# Patient Record
Sex: FEMALE | Race: WHITE
[De-identification: names, ages, dates, MRNs, and addresses within clinical notes are randomized per-mention and may not be internally consistent; named-entity substitution may affect disease eponyms.]

---

## 2018-04-27 ENCOUNTER — HOSPITAL ENCOUNTER (INPATIENT)
Dept: HOSPITAL 80 - FED | Age: 73
LOS: 7 days | Discharge: NURSING, REHAB OR LONG-TERM CARE | DRG: 917 | End: 2018-05-04
Attending: INTERNAL MEDICINE | Admitting: INTERNAL MEDICINE
Payer: COMMERCIAL

## 2018-04-27 DIAGNOSIS — F34.0: ICD-10-CM

## 2018-04-27 DIAGNOSIS — Z66: ICD-10-CM

## 2018-04-27 DIAGNOSIS — G40.909: ICD-10-CM

## 2018-04-27 DIAGNOSIS — R79.89: ICD-10-CM

## 2018-04-27 DIAGNOSIS — F60.9: ICD-10-CM

## 2018-04-27 DIAGNOSIS — T43.591A: Primary | ICD-10-CM

## 2018-04-27 DIAGNOSIS — I10: ICD-10-CM

## 2018-04-27 DIAGNOSIS — N39.0: ICD-10-CM

## 2018-04-27 DIAGNOSIS — G93.40: ICD-10-CM

## 2018-04-27 DIAGNOSIS — E03.9: ICD-10-CM

## 2018-04-27 DIAGNOSIS — B96.20: ICD-10-CM

## 2018-04-27 DIAGNOSIS — G25.9: ICD-10-CM

## 2018-04-27 DIAGNOSIS — E83.52: ICD-10-CM

## 2018-04-27 DIAGNOSIS — G92: ICD-10-CM

## 2018-04-27 LAB — PLATELET # BLD: 322 10^3/UL (ref 150–400)

## 2018-04-27 RX ADMIN — PERPHENAZINE SCH MG: 8 TABLET, FILM COATED ORAL at 22:06

## 2018-04-27 RX ADMIN — PERPHENAZINE SCH MG: 2 TABLET, FILM COATED ORAL at 22:06

## 2018-04-27 NOTE — CPEKG
Heart Rate: 56

RR Interval: 1071

P-R Interval: 144

QRSD Interval: 92

QT Interval: 492

QTC Interval: 475

P Axis: 67

QRS Axis: 48

T Wave Axis: 79

EKG Severity - ABNORMAL ECG -

EKG Impression: SINUS RHYTHM

EKG Impression: NONSPECIFIC T ABNORMALITIES, ANT-LAT LEADS

Electronically Signed By: Randa Strickland 27-Apr-2018 22:19:58

## 2018-04-27 NOTE — EDPHY
H & P


Stated Complaint: AMS


Time Seen by Provider: 04/27/18 15:22


HPI/ROS: 





CHIEF COMPLAINT:  Altered mental status





HISTORY OF PRESENT ILLNESS:  This is a 73-year-old female transferred from [place] because of an alteration in her mental status.  She has a history of 

cyclothymic disorder, personality disorder, hypothyroidism, seizure disorder, 

hypertension, and CHF.  Reportedly she had a fall 2 weeks ago and again this 

past Sunday, 5 days ago.  Per the paramedics, she has been altered since the 

2nd fall and apparently progressively worsening.  She normally ambulates 

independently but has not done so recently.  Lithium level was elevated at 1.8 

on April 25th, 2 days ago.  She has not received lithium the since that lab was 

drawn.  Patient herself does not provide any history.





REVIEW OF SYSTEMS:


Patient unable to provide.





Past medical history:


1. Cyclothymic disorder


2. Personality disorder


3. Hypertension


4. CHF


5. Seizure disorder


6. PICC line due to difficult IV access.  








Social history:  She resides at [place].  No known alcohol use.  No recent 

tobacco use but did smoke cigarettes in the past.  Her records indicate that 

she has a DNR status.





General Appearance:  Alert.  Vital signs reviewed.  Blood pressure 207/73, 

heart rate 62, respiratory rate 20, room air oxygen saturation 98%.  

Temperature 36.4 degrees.


Eyes:  Pupils equal and round, no conjunctival injection, no discharge. 

Anicteric.


ENT, Mouth:  Mucous membranes are , no oropharyngeal erythema or edema.


Neck:  No lymphadenopathy, supple.


Respiratory:  She does not consistently cooperate with the instruction to 

breathe deeply and it is difficult to hear her breath sounds posterior.  Lungs 

are clear anteriorly.


Cardiovascular:  Regular rate and rhythm; no murmur, rub, or gallop.


Gastrointestinal:  Abdomen is soft and nontender, no masses or organomegaly, 

bowel sounds normal.


Skin:  Warm and dry, no rashes on exposed skin, normal color.


Back:  Nontender to palpation over the thoracolumbar spine. No CVAT.


Extremities:  No lower extremity edema, no calf tenderness or swelling.


Neurological:  She has spontaneous eye opening.  When asked her name she states 

that it is "Nae"and then perseverates, repeating her name.  She follows 

simple commands on occasion but not consistently.  She moves both lower 

extremities to mild pain.  Bilateral upper extremity drift.  GIOVANA.  Conjugate 

gaze.  Facial expressions appear symmetric.  Tongue midline.


Psychiatric:  Flat affect.





- Personal History


Current Tetanus/Diphtheria Vaccine: Unsure


Current Tetanus Diphtheria and Acellular Pertussis (TDAP): Unsure





- Medical/Surgical History


Hx Asthma: No


Hx Chronic Respiratory Disease: No


Hx Diabetes: No


Hx Cardiac Disease: Yes


Hx Renal Disease: No


Hx Cirrhosis: No


Hx Alcoholism: No


Hx HIV/AIDS: No


Hx Splenectomy or Spleen Trauma: No


Other PMH: CHF, sz, extrapyramidal disorder, hypothyroidism, HTN, cyclothymic d/

o





- Social History


Smoking Status: Never smoked


Constitutional: 


 Initial Vital Signs











Temperature (C)  36.4 C   04/27/18 15:19


 


Heart Rate  62   04/27/18 15:19


 


Respiratory Rate  22 H  04/27/18 15:19


 


Blood Pressure  207/73 H  04/27/18 15:19


 


O2 Sat (%)  98   04/27/18 15:19








 











O2 Delivery Mode               Nasal Cannula


 


O2 (L/minute)                  2














Allergies/Adverse Reactions: 


 





No Known Allergies Allergy (Unverified 04/02/15 15:56)


 








Home Medications: 














 Medication  Instructions  Recorded


 


Acetaminophen [Tylenol 325mg (*)] 650 mg PO Q6 PRN 04/27/18


 


Benztropine Mesylate 0.25 mg PO DAILY 04/27/18


 


Herbals/Supplements -Info Only 1 ea PO DAILY 04/27/18


 


Levothyroxine [Synthroid 125 mcg 125 mcg PO DAILY06 04/27/18





(*)]  


 


Perphenazine 10 mg PO BID 04/27/18


 


Phenytoin Sodium Extended 300 mg PO DAILY 04/27/18





[Dilantin (*)]  


 


Quetiapine Fumarate 200 mg PO 08,12 04/27/18


 


Quetiapine Fumarate 500 mg PO HS 04/27/18


 


Sennosides [Senokot 8.6mg (OTC)] 1 each PO DAILY 04/27/18


 


Triamterene/Hctz 37.5/25 [Dyazide 1 each PO DAILY 04/27/18





37.5/25 (*)]  














Medical Decision Making





- Diagnostics


Imaging Results: 


 Imaging Impressions





Chest X-Ray  04/27/18 15:22


Impression:  Airways disease and bibasilar atelectasis.








Head CT  04/27/18 15:23


Impression: Elderly brain with atrophy and probable white matter small vessel 

disease. Nothing acute is identified. 


 


Results called and discussed with MASOUD LEE in the on 4/27/2018 at 16:10


 


 











ED Course/Re-evaluation: 





73-year-old female who is unable to provide much in the way of history.  She is 

transferred here for altered mental status.  Based upon the report I have of 

her baseline functional status, she is altered, time frame is unclear.





She is not febrile.  She is hypertensive.  Heart rate is in the 60s.  She is 

oxygenating well.





She was re-evaluated after returning from CT scan.  At that time she was asking 

me what hospital she is in.  When given the answer, she continues to repeat the 

question.  She is able to follow simple commands but does not do so 

consistently.  She has bilateral upper extremity drift.





Serial evaluations were performed with no change.  She continued to perseverate

, follow simple commands inconsistently.  Her speech is not slurred but she is 

having difficulty answering questions--word salad at times.  She was able to 

move all 4 extremities spontaneously.  Her lithium level remains somewhat high 

at 1.5 and her Dilantin level is low.  She has been hypertensive in the 

department.  I do not find evidence of infection.  Chest x-ray shows bibasilar 

atelectasis and signs of airway disease, no acute infiltrate.  Electrolytes are 

within normal limits.  CT scan is reported to me as negative for any acute 

findings that would explain an altered mental status.  She does have a troponin 

that is elevated at 0.036, intermediate range.  She does not report chest pain 

when questioned.  EKG does not show signs of ischemia.  TSH is pending.  I have 

not seen any seizure activity and do not know the nature of her seizures.  At 

this point I do not suspect status epilepticus.  She does not appear to be 

postictal.





Is certainly possible that she has had a stroke.  If so, the timing is entirely 

unknown.  She would not be a candidate for tPA.  Further imaging studies will 

be required to investigate this.  She has a being admitted to the hospitalist 

service.





Records from [place] indicate that she has a do not resuscitate status.








Differential Diagnosis: 





Altered mental status including but not limited to hypoglycemia, infectious 

process, electrolyte abnormality, seizure, head injury and intoxicants.


Critical Care Time: 





I spent a total of 40 minutes of critical care time in obtaining history, 

performing a physical exam, bedside monitoring of interventions, collecting and 

interpreting tests and discussion with consultants but not including time spent 

performing procedures.  System at risk was central nervous system.





- Data Points


Laboratory Results: 


 Laboratory Results





 04/27/18 15:35 





 04/27/18 15:35 





 











  04/27/18 04/27/18 04/27/18





  15:35 15:35 15:35


 


WBC      9.62 10^3/uL H 10^3/uL





     (3.80-9.50) 


 


RBC      3.84 10^6/uL L 10^6/uL





     (4.18-5.33) 


 


Hgb      11.6 g/dL L g/dL





     (12.6-16.3) 


 


Hct      35.6 % L %





     (38.0-47.0) 


 


MCV      92.7 fL fL





     (81.5-99.8) 


 


MCH      30.2 pg pg





     (27.9-34.1) 


 


MCHC      32.6 g/dL g/dL





     (32.4-36.7) 


 


RDW      13.2 % %





     (11.5-15.2) 


 


Plt Count      322 10^3/uL 10^3/uL





     (150-400) 


 


MPV      10.3 fL fL





     (8.7-11.7) 


 


Neut % (Auto)      64.6 % %





     (39.3-74.2) 


 


Lymph % (Auto)      20.8 % %





     (15.0-45.0) 


 


Mono % (Auto)      10.4 % %





     (4.5-13.0) 


 


Eos % (Auto)      3.4 % %





     (0.6-7.6) 


 


Baso % (Auto)      0.4 % %





     (0.3-1.7) 


 


Nucleat RBC Rel Count      0.0 % %





     (0.0-0.2) 


 


Absolute Neuts (auto)      6.21 10^3/uL 10^3/uL





     (1.70-6.50) 


 


Absolute Lymphs (auto)      2.00 10^3/uL 10^3/uL





     (1.00-3.00) 


 


Absolute Monos (auto)      1.00 10^3/uL H 10^3/uL





     (0.30-0.80) 


 


Absolute Eos (auto)      0.33 10^3/uL 10^3/uL





     (0.03-0.40) 


 


Absolute Basos (auto)      0.04 10^3/uL 10^3/uL





     (0.02-0.10) 


 


Absolute Nucleated RBC      0.00 10^3/uL 10^3/uL





     (0-0.01) 


 


Immature Gran %      0.4 % %





     (0.0-1.1) 


 


Immature Gran #      0.04 10^3/uL 10^3/uL





     (0.00-0.10) 


 


Sodium    138 mEq/L mEq/L  





    (135-145)  


 


Potassium    4.0 mEq/L mEq/L  





    (3.5-5.2)  


 


Chloride    107 mEq/L mEq/L  





    ()  


 


Carbon Dioxide    26 mEq/l mEq/l  





    (22-31)  


 


Anion Gap    5 mEq/L L mEq/L  





    (8-16)  


 


BUN    25 mg/dL H mg/dL  





    (7-23)  


 


Creatinine    1.0 mg/dL mg/dL  





    (0.6-1.0)  


 


Estimated GFR    54   





    


 


Glucose    81 mg/dL mg/dL  





    ()  


 


Calcium    11.0 mg/dL H mg/dL  





    (8.5-10.4)  


 


Phosphorus    4.0 mg/dL mg/dL  





    (2.5-4.5)  


 


Troponin I    0.036 ng/mL H ng/mL  





    (0.000-0.034)  


 


TSH  0.072 uIU/mL L uIU/mL    





   (0.465-4.680)   


 


Urine Color      





    


 


Urine Appearance      





    


 


Urine pH      





    


 


Ur Specific Gravity      





    


 


Urine Protein      





    


 


Urine Ketones      





    


 


Urine Blood      





    


 


Urine Nitrate      





    


 


Urine Bilirubin      





    


 


Urine Urobilinogen      





    


 


Ur Leukocyte Esterase      





    


 


Urine RBC      





    


 


Urine WBC      





    


 


Ur Epithelial Cells      





    


 


Urine Glucose      





    


 


Phenytoin    3.4 mcg/mL L mcg/mL  





    (10.0-20.0)  


 


Lithium    1.5 mEq/L H mEq/L  





    (0.6-1.2)  














  04/27/18





  15:30


 


WBC  





  


 


RBC  





  


 


Hgb  





  


 


Hct  





  


 


MCV  





  


 


MCH  





  


 


MCHC  





  


 


RDW  





  


 


Plt Count  





  


 


MPV  





  


 


Neut % (Auto)  





  


 


Lymph % (Auto)  





  


 


Mono % (Auto)  





  


 


Eos % (Auto)  





  


 


Baso % (Auto)  





  


 


Nucleat RBC Rel Count  





  


 


Absolute Neuts (auto)  





  


 


Absolute Lymphs (auto)  





  


 


Absolute Monos (auto)  





  


 


Absolute Eos (auto)  





  


 


Absolute Basos (auto)  





  


 


Absolute Nucleated RBC  





  


 


Immature Gran %  





  


 


Immature Gran #  





  


 


Sodium  





  


 


Potassium  





  


 


Chloride  





  


 


Carbon Dioxide  





  


 


Anion Gap  





  


 


BUN  





  


 


Creatinine  





  


 


Estimated GFR  





  


 


Glucose  





  


 


Calcium  





  


 


Phosphorus  





  


 


Troponin I  





  


 


TSH  





  


 


Urine Color  YELLOW 





  


 


Urine Appearance  CLEAR 





  


 


Urine pH  6.0 





   (5.0-7.5) 


 


Ur Specific Gravity  1.010 





   (1.002-1.030) 


 


Urine Protein  1+  H 





   (NEGATIVE) 


 


Urine Ketones  NEGATIVE 





   (NEGATIVE) 


 


Urine Blood  1+  H 





   (NEGATIVE) 


 


Urine Nitrate  NEGATIVE 





   (NEGATIVE) 


 


Urine Bilirubin  NEGATIVE 





   (NEGATIVE) 


 


Urine Urobilinogen  NEGATIVE EU EU





   (0.2-1.0) 


 


Ur Leukocyte Esterase  NEGATIVE 





   (NEGATIVE) 


 


Urine RBC  1-3 /hpf /hpf





   (0-3) 


 


Urine WBC  1-3 /hpf /hpf





   (0-3) 


 


Ur Epithelial Cells  NONE SEEN /lpf /lpf





   (NONE-1+) 


 


Urine Glucose  NEGATIVE 





   (NEGATIVE) 


 


Phenytoin  





  


 


Lithium  





  











Medications Given: 


 





Sodium Chloride (Ns)  1,000 mls @ 125 mls/hr IV CONT ERICKA


   Stop: 04/28/18 03:14


   Last Admin: 04/27/18 19:54 Dose:  1,000 mls


Perphenazine (Trilafon)  8 mg PO BID ERICKA


   Stop: 10/24/18 20:59


   Last Admin: 04/27/18 22:06 Dose:  8 mg


Perphenazine (Trilafon)  2 mg PO BID ERICKA


   Stop: 10/24/18 20:59


   Last Admin: 04/27/18 22:06 Dose:  2 mg


Quetiapine Fumarate (Seroquel)  500 mg PO HS ERICKA


   Stop: 10/24/18 20:59


   Last Admin: 04/27/18 22:05 Dose:  500 mg





Discontinued Medications





Amlodipine Besylate (Norvasc)  2.5 mg PO ONCE ONE


   Stop: 04/27/18 19:15


   Last Admin: 04/27/18 19:52 Dose:  2.5 mg








Departure





- Departure


Disposition: [facility]


Clinical Impression: 


Altered mental status


Qualifiers:


 Altered mental status type: disorientation Qualified Code(s): R41.0 - 

Disorientation, unspecified





Condition: Fair

## 2018-04-27 NOTE — PDMN
Medical Necessity


Medical necessity: C/M review:  est. > 2 MN LOS for eval and TX of acute and 

persistent obtundation of unclear etiology, positive lithium level without 

being this medication, (possible medication misadministration prior to this 

admission), indeterminate troponin, depressed TSH, hip pain, shoulder pain,  

requiring hip xray, shoulder xray, planned echocardiogram, 4/28/2018 lab - 

llithium level, ongoing IV fluids, increase oral levothyroxine dose, cardiac 

monitoring, acute inpt PT/OT/ST, comorbid seizure disorder, cyclothymic disorder

, history of personality disorder, heart failure, nicotine dependence, EPS 

movement disorder, patient was a smoker in the past, hypertension, 

hypothyroidism, patient had a fall one week prior to this admission with 

decreased responsiveness since then per H/P.

## 2018-04-27 NOTE — GHP
[f rep st]



                                                            HISTORY AND PHYSICAL





DATE OF ADMISSION:  2018



HISTORY OF PRESENT ILLNESS:  The patient is a pleasant 73-year-old female who resides at [facility]
ith a history of hypertension cyclothymic disorder, personality disorder, heart failure, and hypothyr
oidism, presents with a week of being confused.  Apparently she had a fall about a week ago with decr
eased responsiveness since then.  When I speak with the patient, she is obtunded in the sense that sh
e can follow some commands and is monitoring many things that are tangentially related to the convers
ation, but is not able to answer questions such as were you able to walk?  She can not answer that qu
estion.  The ER noted no evidence of heart failure on exam, and the patient is not febrile.  She says
 her shoulders hurt, but I was able to raise her arms above her head passively without pain or crepit
us.  Her shoulders are normal to exam.  She is also complaining of pain in her hips.  It is not clear
 if she has been able to walk.



REVIEW OF SYSTEMS:  Complete 10-point review of systems was attempted but unable to be conducted cabrera
use of her encephalopathy.



PAST MEDICAL HISTORY:  Cyclothymic disorder, personality disorder, heart failure, convulsions, nicoti
ne dependence, EPS movement disorder.



ALLERGIES:  No known drug allergies.



HOME MEDICATIONS:  Benztropine, perphenazine, phenytoin, probiotic, quetiapine, senna, Synthroid, tri
amterene, hydrochlorothiazide, and Tylenol.



SOCIAL HISTORY:  Lives at [place].  Has been a smoker in the past.  I doubt she drinks alcohol the
re.



FAMILY HISTORY:  Parents .



PHYSICAL EXAMINATION:  VITAL SIGNS:  Temp 36.4, blood pressure 207/73, now 185/115, pulse 62, breathi
ng 20 times a minute, 90% on room air.  GENERAL:  No acute distress.  HEENT:  Sclerae anicteric.  Phill
pharynx clear.  Mucous membranes are dry.  NECK:  Supple without lymphadenopathy or JVD.  LUNGS:  Chemo
ar to auscultation bilaterally.  HEART:  S1, S2.  ABDOMEN:  Soft, nontender, nondistended.  LOWER EXT
REMITIES:  No edema.  Calves are nontender.  SKIN:  Without rash.  She has no skin breakdown on her b
ack.  She appears well cared for.  She has no pain when I push on her hips.  NEUROLOGIC:  Notable for
 encephalopathy.



LABORATORY DATA:  White count 9.6, hematocrit 35.6, platelets are 322,000.  Sodium 138, potassium 4.0
, chloride 107, bicarb 26, BUN 25, creatinine 1.0, glucose 11.  Troponin 0.036, which is slightly audrey
vated.  UA is unremarkable.  Phenytoin level is low.  Lithium level is high.  Interestingly, lithium 
is not listed as one of her medications.  EKG interpreted by me shows sinus at 56 with normal axis an
d intervals.  T-wave inversion in 2, otherwise relatively unremarkable EKG.  Chest x-ray interpreted 
by me shows no acute cardiopulmonary disease.  There is a question of something in the right lower lo
be, but the radiologist did not comment on it.  Noncontrast head CT shows elderly brain with atrophy 
and white matter small vessel disease with nothing acute.  I have discussed the case with Randa hernandez.



ASSESSMENT/PLAN:  A 73-year-old female presents with obtundation.

1.  Obtundation.  It is not clear what is driving this.  She does have a positive lithium level witho
ut apparently being on it.  I wonder if she had medication misadministration.  I am not sure that 1 o
f 2 doses would even give you this level.  Will repeat that lithium level in the morning.  Blood cult
ures have been drawn.  I will check for a hip film and a shoulder film to see if she has a fracture, 
which I do not believe.  I think the patient is clinically slightly dry.  We will give her some IV fl
uids.  It may be meaningful to do an MRI of her brain, but she will not be able to lie flat at this p
oint in time.

2.  Indeterminate troponin.  We will cycle these.  Her EKG is nonischemic.

3.  Depressed TSH.  I will decrease her levothyroxine to 100 mcg.

4.  Seizure disorder.  We will continue her seizure medicines.

5.  Cyclothymic disorder.  I will continue her mood stabilizers.



CODE:  DNR.



DISPOSITION:  Inpatient status, PT/OT.





Job #:  [id number]

## 2018-04-28 RX ADMIN — STANDARDIZED SENNA CONCENTRATE SCH TAB: 8.6 TABLET ORAL at 11:09

## 2018-04-28 RX ADMIN — PERPHENAZINE SCH MG: 8 TABLET, FILM COATED ORAL at 11:09

## 2018-04-28 RX ADMIN — PERPHENAZINE SCH MG: 2 TABLET, FILM COATED ORAL at 11:00

## 2018-04-28 RX ADMIN — BENZTROPINE MESYLATE SCH: 1 TABLET ORAL at 12:00

## 2018-04-28 RX ADMIN — BENZTROPINE MESYLATE SCH MG: 1 TABLET ORAL at 11:44

## 2018-04-28 RX ADMIN — EXTENDED PHENYTOIN SODIUM SCH MG: 100 CAPSULE ORAL at 10:59

## 2018-04-28 RX ADMIN — LEVOTHYROXINE SODIUM SCH MCG: 100 TABLET ORAL at 04:45

## 2018-04-28 RX ADMIN — PERPHENAZINE SCH: 2 TABLET, FILM COATED ORAL at 23:34

## 2018-04-28 RX ADMIN — TRIAMTERENE AND HYDROCHLOROTHIAZIDE SCH: 25; 37.5 CAPSULE ORAL at 12:55

## 2018-04-28 RX ADMIN — LEVOTHYROXINE SODIUM SCH: 100 TABLET ORAL at 06:45

## 2018-04-28 RX ADMIN — ENOXAPARIN SODIUM SCH MG: 100 INJECTION SUBCUTANEOUS at 11:26

## 2018-04-28 RX ADMIN — PERPHENAZINE SCH: 8 TABLET, FILM COATED ORAL at 23:34

## 2018-04-28 NOTE — ASMTCMCOM
CM Note

 

CM Note                       

Notes:

Pt admitted for AMS from [place] where she is a long term resident. CM spoke w/GROVER Laughlin at 

, she states that a few weeks ago, pt was A+O x3, independent with ADLs but emotionally 

labile. She would have mood swings, although they could redirect her. There is question about her 

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 

people prior to living at .



Per NP, pt has a high lithium level but is not on lithium, waiting to see if pt clears mentally.



DC Plan: [place]

 

Date Signed:  04/28/2018 02:49 PM

Electronically Signed By:Telma Sosa RN

## 2018-04-28 NOTE — ECHO
[email or web address]





[address] [address] 

Main: [phone] 



Fax: 



Transthoracic Echocardiogram 

Name:             DEREK FINK                      MR#:

[id number]

Study Date:       2018                             Study Time:

08:59 AM

YOB: 1945                             Age:

73 year(s)

Height:             ( )                                  Weight:

72.58 kg (160 lb.)

BSA:                                                     Gender:

Female

Examination:      Echo                                   Indication: 

Image Quality:    Technically Difficult                  Contrast: 

Requested by:     Rian Solis                         BP:

185 mmHg/91 mmHg

Heart Rate:                                              Rhythm: 

Indication: 



Procedure Staff 

Ultrasound Technician:   Kelsey Sevilla [facility] 

Reading Physician:       Tiffany Biggs MD 

Requesting Provider: 



Conclusions:           Normal size left ventricle.  

Mild concentric LV hypertrophy.  

Normal global systolic LV function.  

EF is 67 %.  

No regional wall motion abnormality.  

Grade 1 diastolic dysfunction (abnormal relaxation).  

LVOT velocities increased. Patient is unable to valsava. Peak gradient

is 54 mmhg.

Normal size right ventricle.  

Normal RV function.  

Mild mitral valve regurgitation is present.  

Mild calcific aortic valve stenosis.  

There is no previous echocardiogram for comparison. 



Measurements: 

Chambers                    Valvular Assessment AV/MV

Valvular Assessment TV/PV



Normal                                   Normal

Normal

Name         Value     Range              Name         Value Range

Name           Value Range

Ao Aracely (2D): 2.2 cm    (1.4 cm-2.6            AV meanP mmHg ( -

)          PV Vmax:       1.25 m/s (0.6 m/s-0.9



cm)                TATY (VTI):   1.2 cm ( - ) m/s)  

IVSd (2D):   1.1 cm (0.6 cm-1.1               MV E Vmax:   0.66 m/s (

- ) PV PGmax:                  6  mmHg ( - )



cm)                MV A Vmax:   1.10 m/s ( - )  

LVDd (2D):   3.9 cm    (3.9 cm-5.3            MV E/A:      0.60 ( - )





cm)                MV PHT:      0.100 s ( - )  

LVDs (2D):   2.3 cm    (2.1 cm-4 



cm)                    MVA (PHT):   2.2 s ( - )  

LVPWd (2D):  1.1 cm    ( - )   

LVOTd        1.7 cm    1.7 cm mm 

LVEF (BP):   67 %      (>=55 %)   

RVDd(2D):    2.5 cm    (1.9 cm-3.8 



cmmm)  



Patient: DEREK FINK                     MRN: [id number]

Study Date: 2018   Page 1 of 2

08:59 AM 









Continued Measurements: 

Chambers                      Valvular Assessment AV/MV

Valvular Assessment TV/PV



Name                      Value           Name

Value     Name                      Value

LADs:                   3.4 cm                MV DecTime:

331 m/s      CVP (est.):             5 mmHg

LADs Lon.8 cm                MV E' Septal:

0.05  m/s

LA Area:                14.6 cm2          MV E/E' Septal:        12.50



LA Volume:              45 ml             MV E/E' Lateral:       10.00



RA Area:                12.8 cm2   



Findings:              Left Ventricle: 

Normal size left ventricle. Mild concentric LV hypertrophy. Normal

global systolic LV function. EF is

67 %. No regional wall motion abnormality. Grade 1 diastolic

dysfunction (abnormal relaxation).

LVOT velocities increased. Patient is unable to valsava. Peak gradient

is 54 mmhg.

Right Ventricle: 

Normal size right ventricle. Normal RV function.  

Left Atrium: 

The left atrium is normal in size.  

Right Atrium: 

The right atrium is normal in size.  

Mitral Valve: 

The mitral valve is normal in appearance and function. Mild mitral

valve regurgitation is present. No

mitral stenosis is present.  

Aortic Valve: 

The aortic valve is normal in appearance and function. Mild calcific

aortic valve stenosis.

Tricuspid Valve: 

The tricuspid valve is normal in appearance and function.  

Pulmonic Valve: 

The pulmonic valve is normal in appearance and function.  

Aorta: 

The aorta is normal. Normal size aortic root measuring 2.2 cm.  

IVC: 

The IVC is normal sized.  

Pericardium: 

No pericardial effusion. No pleural effusion.  

Exam Comments: 

Very technically difficult exam. Patient very confused and supine in

armchair throughout exam.

Patient requesting to be done with exam. 







Electronically signed by Tiffany Biggs MD on 2018 at 01:14 PM 

(No Signature Object) 



Patient: DEREK FINK                     MRN: [id number]

Study Date: 2018   Page 2 of 2

08:59 AM 







[id number]

## 2018-04-28 NOTE — HOSPPROG
Hospitalist Progress Note


Assessment/Plan: 





Patient is a 73-year-old female who resides at [facility].  She has a history 

of personality disorder, heart failure and hypothyroidism and presented with 

being confused.  Today is my 1st encounter with the patient.  Chart reviewed.





*Acute encephalopathy -obtunded on admission


-CT of head shows nothing acute


-lithium level is elevated, but better today (patient is not on lithium at 

baseline)


-she is on multiple medications that could cause sedation including Benztropine

, phenytoin, Seroquel, perphenazine


-she appears dehydrated, will gently hydrate overnight, hold her diuretic.  If 

she doesn't clear soon will ask psychiatry to see and evaluate her medications


-CM spoke with [facility] and at baseline she is oriented x 3. During my 

interview she is oriented only to herself


-chest xray stable, minimally elevated trop, reviewed x rays which show no 

fracture





*indeterminate trop


-resolved





*concern for right femoral neck fx


-CT scan doesn't show a fracture





*cyclothymic disorder, personality disorder, EPS movement disorder





*depressed TSH,


-Synthroid dose decreased


-need a repeat TSH in 6 weeks





*Seizure disorder


-home meds continued





*hypercalcemia


-most likely secondary to dehydration, recheck in a.m.





*Plan: will monitor overnight, if still confused tomorrow; will ask Psychiatry 

to evaluate her medications. 











Subjective: Stacey is thirsty


Objective: 


 Vital Signs











Temp Pulse Resp BP Pulse Ox


 


 36.8 C   68   18   185/91 H  94 


 


 04/28/18 08:00  04/28/18 08:00  04/28/18 08:00  04/28/18 08:00  04/28/18 08:00














- Physical Exam


Constitutional: chronically ill appearing


Eyes: PERRL


Ears, Nose, Mouth, Throat: hearing normal


Cardiovascular: regular rate and rhythym


Respiratory: no respiratory distress


Skin: warm


Neurologic: other (alert, wants to drink water)


Psychiatric: encephalopathic





ICD10 Worksheet


Patient Problems: 


 Problems











Problem Status Onset


 


Altered mental status Acute

## 2018-04-29 LAB — PLATELET # BLD: 264 10^3/UL (ref 150–400)

## 2018-04-29 RX ADMIN — PERPHENAZINE SCH MG: 2 TABLET, FILM COATED ORAL at 08:14

## 2018-04-29 RX ADMIN — PERPHENAZINE SCH MG: 8 TABLET, FILM COATED ORAL at 22:02

## 2018-04-29 RX ADMIN — STANDARDIZED SENNA CONCENTRATE SCH: 8.6 TABLET ORAL at 08:13

## 2018-04-29 RX ADMIN — PERPHENAZINE SCH MG: 2 TABLET, FILM COATED ORAL at 22:02

## 2018-04-29 RX ADMIN — BENZTROPINE MESYLATE SCH MG: 1 TABLET ORAL at 08:11

## 2018-04-29 RX ADMIN — LEVOTHYROXINE SODIUM SCH MCG: 100 TABLET ORAL at 05:37

## 2018-04-29 RX ADMIN — EXTENDED PHENYTOIN SODIUM SCH MG: 100 CAPSULE ORAL at 07:58

## 2018-04-29 RX ADMIN — PERPHENAZINE SCH MG: 8 TABLET, FILM COATED ORAL at 08:14

## 2018-04-29 RX ADMIN — TRIAMTERENE AND HYDROCHLOROTHIAZIDE SCH: 25; 37.5 CAPSULE ORAL at 11:12

## 2018-04-29 RX ADMIN — ACETAMINOPHEN PRN MG: 325 TABLET ORAL at 14:44

## 2018-04-29 RX ADMIN — ENOXAPARIN SODIUM SCH MG: 100 INJECTION SUBCUTANEOUS at 08:13

## 2018-04-29 NOTE — HOSPPROG
Hospitalist Progress Note


Assessment/Plan: 





Patient is a 73-year-old female who resides at [facility].  She has a history 

of personality disorder, heart failure and hypothyroidism and presented with 

being confused.  





*Acute encephalopathy -obtunded on admission


-CT of head shows nothing acute


-lithium level was elevated (patient is not on lithium at baseline)


-she is on multiple medications that could cause sedation including Benztropine

, phenytoin, Seroquel, perphenazine


-CM spoke with [facility] and at baseline she is oriented x 3. During my 

interview she is oriented only to herself


-chest xray stable, minimally elevated trop, reviewed x rays which show no 

fracture





*indeterminate trop


-resolved





*HTN: bp is elevated,resumed home meds now that she is eating and drinking





*concern for right femoral neck fx


-CT scan doesn't show a fracture





*cyclothymic disorder, personality disorder, EPS movement disorder





*depressed TSH,


-Synthroid dose decreased


-need a repeat TSH in 6 weeks





*Seizure disorder


-home meds continued





*hypercalcemia


-most likely secondary to dehydration


-resolved





*Plan: will ask psychiatry to get involved, she's on multiple sedating 

medications and need their input on this/ spoke with Dr Montez who was willing to 

look at her medications, but would appreciate having psychiatry see her, he 

will let Dr Jenkins know about seeing patient.








Subjective: Stacey is not c/o pain, keeps asking who I am.


Objective: 


 Vital Signs











Temp Pulse Resp BP Pulse Ox


 


 36.4 C   69   20   172/69 H  90 L


 


 04/29/18 08:00  04/29/18 08:00  04/29/18 08:00  04/29/18 08:00  04/29/18 08:00








 Laboratory Results





 04/29/18 06:15 





 04/29/18 06:15 





 











 04/28/18 04/29/18 04/30/18





 05:59 05:59 05:59


 


Intake Total  1100 


 


Balance  1100 














- Physical Exam


Constitutional: chronically ill appearing, unkempt


Eyes: PERRL


Ears, Nose, Mouth, Throat: hearing normal


Respiratory: no respiratory distress


Skin: warm


Musculoskeletal: generalized weakness


Neurologic: other (alert)


Psychiatric: encephalopathic





ICD10 Worksheet


Patient Problems: 


 Problems











Problem Status Onset


 


Altered mental status Acute

## 2018-04-30 RX ADMIN — PERPHENAZINE SCH MG: 8 TABLET, FILM COATED ORAL at 12:52

## 2018-04-30 RX ADMIN — LEVOTHYROXINE SODIUM SCH MCG: 100 TABLET ORAL at 05:22

## 2018-04-30 RX ADMIN — TRIAMTERENE AND HYDROCHLOROTHIAZIDE SCH EACH: 25; 37.5 CAPSULE ORAL at 12:14

## 2018-04-30 RX ADMIN — PERPHENAZINE SCH MG: 2 TABLET, FILM COATED ORAL at 12:52

## 2018-04-30 RX ADMIN — PERPHENAZINE SCH MG: 8 TABLET, FILM COATED ORAL at 20:27

## 2018-04-30 RX ADMIN — ENOXAPARIN SODIUM SCH MG: 100 INJECTION SUBCUTANEOUS at 10:21

## 2018-04-30 RX ADMIN — STANDARDIZED SENNA CONCENTRATE SCH TAB: 8.6 TABLET ORAL at 12:54

## 2018-04-30 RX ADMIN — EXTENDED PHENYTOIN SODIUM SCH MG: 100 CAPSULE ORAL at 10:16

## 2018-04-30 RX ADMIN — PERPHENAZINE SCH MG: 2 TABLET, FILM COATED ORAL at 20:21

## 2018-04-30 RX ADMIN — BENZTROPINE MESYLATE SCH MG: 1 TABLET ORAL at 12:16

## 2018-04-30 NOTE — HOSPPROG
Hospitalist Progress Note


Assessment/Plan: 





Patient is a 73-year-old female who resides at [facility].  She has a history 

of personality disorder, heart failure and hypothyroidism and presented with 

being confused.  





*Acute encephalopathy -obtunded on admission


-CT of head shows nothing acute


-lithium level was elevated (patient is not on lithium at baseline)


-she is on multiple medications that could cause sedation including Benztropine

, phenytoin, Seroquel, perphenazine


-CM spoke with [facility] and at baseline she is oriented x 3. During my 

interview she is oriented only to herself


-chest xray stable, minimally elevated trop, reviewed x rays which show no 

fracture


-evaluated the patient with Dr Gregory lugo psychiatry, she will look at her 

medications and further evaluate





*indeterminate trop


-resolved





*HTN: bp is uncontrolled at times, have added Norvasc


-prn hydralazine





*concern for right femoral neck fx


-CT scan doesn't show a fracture





*cyclothymic disorder, personality disorder, EPS movement disorder





*depressed TSH,


-Synthroid dose decreased


-need a repeat TSH in 6 weeks





*Seizure disorder


-home meds continued





*hypercalcemia


-most likely secondary to dehydration


-resolved





*Plan: appreciate Dr Jenkins seeing Stacey, will add iv fluids; patient is 

not drinking much





Subjective: Stacey answers yes and no, difficult to get much information 

from her. When asked if she is in pain, she said no.


Objective: 


 Vital Signs











Temp Pulse Resp BP Pulse Ox


 


 36.7 C   77   19   177/71 H  90 L


 


 04/30/18 16:00  04/30/18 16:00  04/30/18 16:00  04/30/18 16:00  04/30/18 16:00








 Laboratory Results





 04/29/18 06:15 





 04/29/18 06:15 





 











 04/29/18 04/30/18 05/01/18





 05:59 05:59 05:59


 


Intake Total 1100 305 200


 


Balance 1100 305 200














- Physical Exam


Constitutional: chronically ill appearing


Eyes: PERRL


Ears, Nose, Mouth, Throat: No moist mucous membranes (dry)


Cardiovascular: regular rate and rhythym


Respiratory: no respiratory distress


Gastrointestinal: normoactive bowel sounds, distension (sllight)


Skin: warm


Musculoskeletal: generalized weakness


Neurologic: other (alert and answers to her name)


Psychiatric: encephalopathic





ICD10 Worksheet


Patient Problems: 


 Problems











Problem Status Onset


 


Altered mental status Acute

## 2018-05-01 RX ADMIN — TRIAMTERENE AND HYDROCHLOROTHIAZIDE SCH EACH: 25; 37.5 CAPSULE ORAL at 10:56

## 2018-05-01 RX ADMIN — PERPHENAZINE SCH MG: 2 TABLET, FILM COATED ORAL at 10:55

## 2018-05-01 RX ADMIN — EXTENDED PHENYTOIN SODIUM SCH MG: 100 CAPSULE ORAL at 10:54

## 2018-05-01 RX ADMIN — PERPHENAZINE SCH MG: 8 TABLET, FILM COATED ORAL at 20:03

## 2018-05-01 RX ADMIN — PERPHENAZINE SCH MG: 2 TABLET, FILM COATED ORAL at 20:03

## 2018-05-01 RX ADMIN — PERPHENAZINE SCH MG: 8 TABLET, FILM COATED ORAL at 10:55

## 2018-05-01 RX ADMIN — LEVOTHYROXINE SODIUM SCH MCG: 100 TABLET ORAL at 04:16

## 2018-05-01 RX ADMIN — ENOXAPARIN SODIUM SCH MG: 100 INJECTION SUBCUTANEOUS at 10:54

## 2018-05-01 RX ADMIN — STANDARDIZED SENNA CONCENTRATE SCH TAB: 8.6 TABLET ORAL at 10:55

## 2018-05-01 NOTE — ASMTCMCOM
CM Note

 

CM Note                       

Notes:

CM spoke w/ Mariya Perdue NP regarding d/c POC. Lashay Jenkins consulted on pt today. Lashay will 

be making some medication recommendations. The plan is for pt to return to [facility] once 

medically stable. Updates sent to [facility]. CM to follow.







Plan: [facility]

 

Date Signed:  05/01/2018 11:20 AM

Electronically Signed By:LEAH Armenta

## 2018-05-01 NOTE — HOSPPROG
Hospitalist Progress Note


Assessment/Plan: 





Patient is a 73-year-old female who resides at [facility].  She has a history 

of personality disorder, heart failure and hypothyroidism and presented with 

being confused.  





*Acute encephalopathy -obtunded on admission


-CT of head shows nothing acute


-lithium level was elevated (patient is not on lithium at baseline)


-she is on multiple medications that could cause sedation including Benztropine

, phenytoin, Seroquel, perphenazine


-CM spoke with [facility] and at baseline she is oriented x 3. During my 

interview she is oriented only to herself


-chest xray stable, minimally elevated trop, reviewed x rays which show no 

fracture


-appreciate Dr Jenkins involvement





*pyuria


-asked for urine cx on urine sent


-patient is unable to tell me if she is having any symptoms


-will treat





*indeterminate trop


-resolved





*HTN: bp is uncontrolled at times, have added Norvasc


-prn hydralazine





*concern for right femoral neck fx


-CT scan doesn't show a fracture





*cyclothymic disorder, personality disorder, EPS movement disorder





*depressed TSH,


-Synthroid dose decreased


-need a repeat TSH in 6 weeks





*Seizure disorder


-home meds continued





*hypercalcemia


-most likely secondary to dehydration


-resolved





*Plan:Dr Jenkins and myself evaluated the patient, she is very agitated during 

my interview.


Subjective: patient wants ice tea


Objective: 


 Vital Signs











Temp Pulse Resp BP Pulse Ox


 


 36.2 C   77   18   188/83 H  90 L


 


 05/01/18 04:00  05/01/18 08:00  05/01/18 08:00  05/01/18 08:00  05/01/18 08:00








 Laboratory Results





 04/29/18 06:15 





 04/29/18 06:15 





 











 04/30/18 05/01/18 05/02/18





 05:59 05:59 05:59


 


Intake Total 305 500 


 


Balance 305 500 














- Physical Exam


Constitutional: chronically ill appearing, uncomfortable


Eyes: PERRL


Ears, Nose, Mouth, Throat: hearing normal


Cardiovascular: regular rate and rhythym


Gastrointestinal: normoactive bowel sounds


Skin: warm, No normal color (pale)


Neurologic: other (alert and oriented to herself, difficult to get her to 

answer questions, but a times will)


Psychiatric: encephalopathic





ICD10 Worksheet


Patient Problems: 


 Problems











Problem Status Onset


 


Altered mental status Acute

## 2018-05-02 RX ADMIN — ENOXAPARIN SODIUM SCH MG: 100 INJECTION SUBCUTANEOUS at 08:59

## 2018-05-02 RX ADMIN — PERPHENAZINE SCH: 8 TABLET, FILM COATED ORAL at 12:35

## 2018-05-02 RX ADMIN — STANDARDIZED SENNA CONCENTRATE SCH TAB: 8.6 TABLET ORAL at 08:58

## 2018-05-02 RX ADMIN — PERPHENAZINE SCH MG: 8 TABLET, FILM COATED ORAL at 23:17

## 2018-05-02 RX ADMIN — LEVOTHYROXINE SODIUM SCH MCG: 100 TABLET ORAL at 06:14

## 2018-05-02 RX ADMIN — PERPHENAZINE SCH MG: 2 TABLET, FILM COATED ORAL at 23:17

## 2018-05-02 RX ADMIN — STANDARDIZED SENNA CONCENTRATE SCH: 8.6 TABLET ORAL at 15:23

## 2018-05-02 RX ADMIN — PERPHENAZINE SCH MG: 2 TABLET, FILM COATED ORAL at 08:58

## 2018-05-02 RX ADMIN — PERPHENAZINE SCH MG: 8 TABLET, FILM COATED ORAL at 08:58

## 2018-05-02 RX ADMIN — EXTENDED PHENYTOIN SODIUM SCH MG: 100 CAPSULE ORAL at 08:58

## 2018-05-02 RX ADMIN — PERPHENAZINE SCH: 2 TABLET, FILM COATED ORAL at 12:35

## 2018-05-02 RX ADMIN — TRIAMTERENE AND HYDROCHLOROTHIAZIDE SCH EACH: 25; 37.5 CAPSULE ORAL at 08:58

## 2018-05-02 NOTE — HOSPPROG
Hospitalist Progress Note


Assessment/Plan: 





Patient is a 73-year-old female who resides at [facility].  She has a history 

of personality disorder, heart failure and hypothyroidism and presented with 

being confused.  





*Acute encephalopathy -obtunded on admission


-CT of head shows nothing acute


-lithium level was elevated (patient is not on lithium at baseline)


-she is on multiple medications that could cause sedation including Benztropine

, phenytoin, Seroquel, perphenazine


-CM spoke with [facility] and at baseline she is oriented x 3. 


-patient has significantly improved with hydration and treatment of likely a UTI

, she is drowsy, but interactive





*UTI


-urine cx pending


-patient is unable to tell me if she is having any symptoms


-will treat





*indeterminate trop


-resolved





*HTN: bp is uncontrolled at times


-increased Norvasc dose and scheduled hydralazine





*concern for right femoral neck fx


-CT scan doesn't show a fracture





*cyclothymic disorder, personality disorder, EPS movement disorder


-med management per Dr Jenkins





*depressed TSH,


-Synthroid dose decreased


-need a repeat TSH in 6 weeks





*Seizure disorder


-home meds continued





*hypercalcemia


-most likely secondary to dehydration


-resolved





*Plan: patient is finally clearing today.  Suspect the lithium level, 

dehydration, uti were all factors of her encephalopathy. Suspect she will be 

ready for discharge in the next day or two.On discharge, she will need a repeat 

TSH in 6 weeks, blood pressure meds and any changes Dr Jenkins has made.  


Subjective: Stacey wants milk and doesn't want to get oob.


Objective: 


 Vital Signs











Temp Pulse Resp BP Pulse Ox


 


 37 C   68   16   166/62 H  92 


 


 05/02/18 08:00  05/02/18 08:00  05/02/18 08:00  05/02/18 08:00  05/02/18 08:00








 Laboratory Results





 04/29/18 06:15 





 04/29/18 06:15 





 











 05/01/18 05/02/18 05/03/18





 05:59 05:59 05:59


 


Intake Total 500 100 


 


Balance 500 100 














- Physical Exam


Constitutional: not in pain, chronically ill appearing


Eyes: PERRL


Ears, Nose, Mouth, Throat: hearing normal


Respiratory: no respiratory distress


Skin: warm


Musculoskeletal: generalized weakness


Psychiatric: interacting appropriately, encephalopathic (still, she doesn't 

know where she is, follows commands (needs encouragement))





ICD10 Worksheet


Patient Problems: 


 Problems











Problem Status Onset


 


Altered mental status Acute

## 2018-05-02 NOTE — PDCONSULT
Consultant Note: 





PSYCHIATRY MD CONSULTATION





Consult requested to make recommendations for psychiatric medications in this 

72yo female with long psychiatric history, on several medications and 

presenting from her residence at [facility] with AMS and Lithium level elevated 

at 1.5.





Patient case reviewed and discussed with hospitalist Mariya Perdue, and pt 

briefly evaluated with hospitalist on 04/30/18 and patient was felt to have 

significant AMS altho per hospitalist she was better since admission with 

hydration and discontinuation of Lithium. Evaluated again 5/1/18, as well as 

today 5/2/18. This note incorporates summary of medication recommendations 

based on recent assessments and history. 





On 5/1, met with patient x 20min, with hospitalist present for initial part of 

interview. Pt reclined in bed with fair eye contact, and  difficult to 

understand speech. perseverated on "I want Brisk, get me some Brisk" and 

eventually clarified that she was referring to Brisk tea. Denied physical 

discomfort although noted to be incontinent of urine. Oriented to person, 

[facility], [facility], but difficult to engage in any more detailed cognitive 

assessment. Affect somewhat annoyed and demanding, wanting tea, but did not 

appear grossly depressed, manic or psychotic. Did not appear responding to 

internal stimuli. 





Met with patient again on 5/2. Patient has been clearing cognitively gradually, 

and noted to be much more alert, and conversant today compared to the last 2 

days. Seems may have some baseline cognitive impairment. Hospitalist noted pt 

very somnolent this AM on 5/2 and difficult to arouse, but pt did ultimately 

receive all AM meds with included Seroquel 200mg in AM. 


On  interview at noon today, pt was sitting up and conversant but reported 

feeling "tired... and cold" (asked for heat to be turned up). Occasionally 

briefly easily nodded off during conversation but was easy to arouse. Sitting 

up in chair, having just finished lunch. Drinking from can, without spilling (

which she had difficulty doing the last 2 days), good eye contact, nml speech 

rate/vol although slightly dysarthric due to nearly edentulous, mood "I feel a 

lot better". affect mildly irritable as she perseverated on wanting Norma 

lipstick "Malaysian red", and to turn heat up, but overall affect controlled. 

generally with linear responses to questions. denied any feelings of depression

, denied AH/VH. insight fair into continuing need for current treatment, 

judgment impaired. cognition seems impaired but improving. Oriented to self, 

place, 2018 "spring" but not date or day.  Able to state today that she thinks 

Lithium was started 2-3 weeks ago. Reports slept "average" last night. 


Due to occasional nodding off, and c/o feeling tired, will hold noon-time 

medications and monitor. 


Per ns staff, pt only incontinent once today, and once used commode. 





ASSESSMENT:


72yo with long history of psychiatric illness (dxd with Cyclothymic disorder 

and unspecified personality d/o) admitted with recent AMS in context of lithium 

toxicity, dehydration and UTI.


Steadily improving with clearing of Lithium, rehydration and treatment of UTI.


May have underlying neurocognitive disorder (dementia), unspecified.





REC:


-do not recommend restarting Lithium. No clear indication for this medication 

at this time, Seroquel has mood stabilizing effects and she is on high dose of 

this (200/200/500), also on Trilafon 10mg bid. 


-Discontinued Cogentin 0.25mg which is anticholinergic, although this low dose 

unlikely to be contributing to her AMS given other likely etiological factors. 


-Hospitalist started ABXs for UTI on 5/1. Anticipate steady improvement 

clinicallly with UTI treatment and d/c of Lithium


-collateral from [facility] prescribing MD or psychiatrist would be helpful


-holding noon dose of seroquel today due to still feeling sedated midday; pt 

refused it yesterday. will consider holding it again at noon tomorrow and 

monitoring for any emergence of, or worsening of, underlying psychiatric 

illness. Consider discontinue noontime dose entirely if no worsening of 

psychiatric symptoms, and just continue with Seroquel 200/500 and Trilafon 10mg 

BID, or could give higher dose at HS to decrease daytime sedation. 


-perhaps staff from [facility] or any family/friends who know patient well 

could assist in determining how close patient is to baseline


-Appreciate consult, will continue to follow while pt hsopitalized and will 

continue to make recommendations as indicated.

## 2018-05-03 RX ADMIN — STANDARDIZED SENNA CONCENTRATE SCH TAB: 8.6 TABLET ORAL at 08:48

## 2018-05-03 RX ADMIN — PERPHENAZINE SCH MG: 8 TABLET, FILM COATED ORAL at 08:47

## 2018-05-03 RX ADMIN — PERPHENAZINE SCH MG: 2 TABLET, FILM COATED ORAL at 20:43

## 2018-05-03 RX ADMIN — TRIAMTERENE AND HYDROCHLOROTHIAZIDE SCH EACH: 25; 37.5 CAPSULE ORAL at 08:48

## 2018-05-03 RX ADMIN — ENOXAPARIN SODIUM SCH MG: 100 INJECTION SUBCUTANEOUS at 08:46

## 2018-05-03 RX ADMIN — PERPHENAZINE SCH MG: 2 TABLET, FILM COATED ORAL at 08:47

## 2018-05-03 RX ADMIN — ACETAMINOPHEN PRN MG: 325 TABLET ORAL at 22:39

## 2018-05-03 RX ADMIN — LISINOPRIL SCH MG: 10 TABLET ORAL at 12:49

## 2018-05-03 RX ADMIN — PERPHENAZINE SCH MG: 8 TABLET, FILM COATED ORAL at 20:43

## 2018-05-03 RX ADMIN — EXTENDED PHENYTOIN SODIUM SCH MG: 100 CAPSULE ORAL at 08:46

## 2018-05-03 RX ADMIN — LEVOTHYROXINE SODIUM SCH MCG: 100 TABLET ORAL at 05:36

## 2018-05-03 NOTE — HOSPPROG
Hospitalist Progress Note


Assessment/Plan: 





Patient is a 73-year-old female who resides at [facility].  She has a history 

of personality disorder, heart failure and hypothyroidism and presented with 

being confused. First encounter, chart reviewed. D/W CM. 





*Acute encephalopathy -obtunded on admission


-CT of head shows nothing acute


-lithium level was elevated (patient is not on lithium at baseline)


-DC lithium


-she is on multiple medications that could cause sedation including Benztropine

, phenytoin, Seroquel, perphenazine


-CM spoke with [facility] and at baseline she is oriented x 3. 


-patient has significantly improved with hydration and treatment of likely a UTI

, she is drowsy, but interactive





*UTI


-urine cx E.coli


-patient is unable to tell me if she is having any symptoms


-treat with CTX





*indeterminate trop


-resolved





*HTN: 


-bp is uncontrolled at times


-increased Norvasc dose and scheduled hydralazine


-add lisinopril





*concern for right femoral neck fx


-CT scan doesn't show a fracture





*cyclothymic disorder, personality disorder, EPS movement disorder


-med management per Dr Jenkins


-see note





*depressed TSH,


-Synthroid dose decreased


-need a repeat TSH in 6 weeks





*Seizure disorder


-home meds continued





*hypercalcemia


-most likely secondary to dehydration


-resolved





*Plan: patient is finally clearing.  Suspect the lithium level, dehydration, 

uti were all factors of her encephalopathy. Suspect she will be ready for 

discharge in the next day or two. On discharge, she will need a repeat TSH in 6 

weeks, blood pressure meds and any changes Dr Jenkins has made.  


Subjective: Wants breakfast. Confused. Denies pain.


Objective: 


 Vital Signs











Temp Pulse Resp BP Pulse Ox


 


 36.3 C   75   18   173/62 H  91 L


 


 05/03/18 11:14  05/03/18 11:14  05/03/18 11:14  05/03/18 11:14  05/03/18 11:14








 Microbiology











 05/01/18 07:00 Urine Culture - Final





 Urine,Clean Catch    Escherichia Coli


 


 04/27/18 20:40 Blood Culture - Final





 Blood 


 


 04/27/18 19:45 Blood Culture - Final





 Blood 








 Laboratory Results





 04/29/18 06:15 





 04/29/18 06:15 





 











 05/02/18 05/03/18 05/04/18





 05:59 05:59 05:59


 


Intake Total 100 720 


 


Output Total  300 300


 


Balance 100 420 -300














- Physical Exam


Constitutional: appears nourished, not in pain, chronically ill appearing


Eyes: PERRL, anicteric sclera, EOMI


Ears, Nose, Mouth, Throat: moist mucous membranes, hearing normal, ears appear 

normal


Cardiovascular: No JVD, No tachycardia, No edema


Respiratory: no respiratory distress, no rales or rhonchi, reduced air movement


Gastrointestinal: normoactive bowel sounds, No tenderness, No ascites


Skin: warm, normal color, No mottled


Musculoskeletal: normal joint ROM, no joint effusions, generalized weakness


Neurologic: No AAOx3


Psychiatric: encephalopathic, anxious, poor insight, poor judgement, poor memory

, No thought process linear





ICD10 Worksheet


Patient Problems: 


 Problems











Problem Status Onset


 


Altered mental status Acute

## 2018-05-04 VITALS — SYSTOLIC BLOOD PRESSURE: 149 MMHG | DIASTOLIC BLOOD PRESSURE: 59 MMHG

## 2018-05-04 RX ADMIN — LISINOPRIL SCH MG: 10 TABLET ORAL at 08:18

## 2018-05-04 RX ADMIN — STANDARDIZED SENNA CONCENTRATE SCH TAB: 8.6 TABLET ORAL at 08:18

## 2018-05-04 RX ADMIN — PERPHENAZINE SCH MG: 2 TABLET, FILM COATED ORAL at 08:18

## 2018-05-04 RX ADMIN — EXTENDED PHENYTOIN SODIUM SCH MG: 100 CAPSULE ORAL at 08:16

## 2018-05-04 RX ADMIN — LEVOTHYROXINE SODIUM SCH MCG: 100 TABLET ORAL at 05:25

## 2018-05-04 RX ADMIN — PERPHENAZINE SCH MG: 8 TABLET, FILM COATED ORAL at 08:17

## 2018-05-04 RX ADMIN — ENOXAPARIN SODIUM SCH MG: 100 INJECTION SUBCUTANEOUS at 08:21

## 2018-05-04 RX ADMIN — TRIAMTERENE AND HYDROCHLOROTHIAZIDE SCH EACH: 25; 37.5 CAPSULE ORAL at 08:17

## 2018-05-04 NOTE — GDS
[f rep st]



                                                             DISCHARGE SUMMARY





DISCHARGE DIAGNOSES:  

1.  Acute encephalopathy.

2.  Urinary tract infection.

3.  Indeterminate troponin.

4.  Hypertension.

5.  Abnormal thyroid stimulating hormone.

6.  Seizure disorder.

7.  Hypercalcemia.



CONSULTATIONS:  Psychology.



STUDIES AND PROCEDURE PERFORMED:  

1.  CT of the head.

2.  Hip x-ray.

3.  Shoulder x-ray.

4.  Echocardiogram.

5.  Pelvic CT.



PHYSICAL EXAM:  GENERAL:  The patient is alert 

VITAL SIGNS:  Afebrile at 36.6, pulse is 74, respiratory rate is 18, blood pressure is 149/59, she is
 saturating 96% on room air.  I have seen and evaluated the patient on the day of discharge.



HOSPITAL COURSE:  The patient is a 73-year-old female who resides at [facility].  She was brought to 
the emergency room with complaints of altered mental status.  She was evaluated and diagnosed with:

1.  Acute encephalopathy.  This is multifactorial.  The patient had been recently on lithium for a sh
ort amount of time.  Her lithium level is elevated.  This has been discontinued.  Her other medicatio
ns have been adjusted during this hospital course.  Her acute encephalopathy has resolved.  She has r
eturned to her baseline mentation.

2.  Urinary tract infection.  This is an Escherichia coli urinary tract infection.  She has been hayden
efraín with Rocephin during this hospital course and does not require any further antibiotic therapy.

3.  Hypertension.  Antihypertensives have been added to her regimen.  Her blood pressure is stable, h
owever, needs to be monitored at the time of disposition.

4.  Abnormal thyroid stimulating hormone.  This should be evaluated in 6 weeks.

5.  History of seizure disorder as well as psychiatric disorder.  She did receive a consultation from
 Psychiatry during this hospitalization.  Her home medications have been adjusted.

6.  Disposition.  The patient will be discharged to return to [facility] where she normally resides. 
 There are no pending studies.



DISCHARGE MEDICATIONS:  Please refer to EMR form.  I have adjusted the patient's Seroquel as well as 
her oral Levaquin.



FOLLOWUP:  Followup will be with her primary care physician. 



I spent greater than 35 minutes in the care, coordination, and management of the patient's dispositio
n.





Job #:  [id number]

## 2018-05-04 NOTE — ASMTLACE
LACE

 

Length of stay for            Answers:  4-6 days                              

current admission                                                             

Acuity / Level of             Answers:  Yes                                   

Care: Did the patient                                                         

have an inpatient                                                             

admission?                                                                    

Comorbidities - select        Answers:  Congestive heart failure              

all that apply                                                                

                                        Coronary Artery Disease               

                                        History of falls                      

# of Emergency department     Answers:  1-2                                   

visits in the last 6                                                          

months                                                                        

Social determinants           Answers:  Mental health diagnosis               

                                        (anxiety, depression, pers            

                                        onality disorders, etc.)              

Score: 18

 

Date Signed:  05/04/2018 10:30 AM

Electronically Signed By:Telma Sosa RN

## 2018-05-04 NOTE — PDIAF
- Diagnosis


Diagnosis: ams


Code Status: Do Not Resuscitate





- Medication Management


Discharge Medications: 


 Medications to Continue on Transfer





Acetaminophen [Tylenol 325mg (*)] 650 mg PO Q6 PRN 04/27/18 [Last Taken Unknown]


Perphenazine 10 mg PO BID 04/27/18 [Last Taken Unknown]


Phenytoin Sodium Extended [Dilantin (*)] 300 mg PO DAILY 04/27/18 [Last Taken 

Unknown]


Quetiapine Fumarate 200 mg PO 08,12 04/27/18 [Last Taken Unknown]


Quetiapine Fumarate 500 mg PO HS 04/27/18 [Last Taken Unknown]


Sennosides [Senokot] 1 each PO DAILY 04/27/18 [Last Taken Unknown]


Triamterene/Hctz 37.5/25 [Dyazide 37.5/25 (*)] 1 each PO DAILY 04/27/18 [Last 

Taken Unknown]


Levothyroxine [Synthroid 100 mcg (*)] 100 mcg PO DAILY AT 6AM  tab 05/04/18 [

Last Taken Unknown]


Lisinopril [Zestril 10 mg (*)] 10 mg PO DAILY  tab 05/04/18 [Last Taken Unknown]


Perphenazine [Trilafon] 2 mg PO BID  tab 05/04/18 [Last Taken Unknown]


amLODIPine BESYLATE [Norvasc 5 mg (*)] 10 mg PO DAILY  tab 05/04/18 [Last Taken 

Unknown]


hydrALAZINE [Apresoline] 25 mg PO TID  tab 05/04/18 [Last Taken Unknown]








Discharge Medications: Refer to the Discharge Home Medication list for PRN 

reason.





- Orders


Services needed: Registered Nurse, Physical Therapy, Occupational Therapy


Diet Recommendation: no restrictions on diet


Diet Texture: Dysphagia 1 - Pureed, Thin Liquids, Meds Whole in Puree





- Follow Up Care


Current Providers and Referrals: 


Patient,NotPresent [Unknown] - As per Instructions